# Patient Record
(demographics unavailable — no encounter records)

---

## 2025-02-10 NOTE — CARDIOLOGY SUMMARY
[Today's Date] : [unfilled] [FreeTextEntry1] : Normal Sinus Rhythm Normal Axis QTc 385-407 ms [FreeTextEntry2] : Summary: 1. No evidence of significant atrial communication; cannot rule out a patent foramen ovale. 2. Normal left ventricular size, morphology and systolic function. 3. Trivial mitral valve regurgitation. 4. Trivial tricuspid valve regurgitation, peak systolic instantaneous gradient 19.3 mmHg. 5. Trivial pulmonary valve regurgitation. 6. No pericardial effusion. [de-identified] : 02/04/2025 [de-identified] : A 7-day Holter was ordered

## 2025-02-10 NOTE — DISCUSSION/SUMMARY
[FreeTextEntry1] : RAVI's  workup revealed:  -Arrhythmias are not fully ruled out. A 7-day Holter was ordered -He  had the incidental finding of mitral insufficiency. The insufficiency did not appear to be hemodynamically significant -He  had the incidental finding of trivial tricuspid insufficiency. Trivial tricuspid insufficiency is a common finding, considered a physiologic variant of normal and  allowed us to calculate estimated pulmonary artery pressures as normal. -He had the incidental finding of pulmonary insufficiency. The insufficiency did not appear to be hemodynamically significant and represents a normal variant  He  does not require any restrictions from a cardiac standpoint.  He does not require antibiotic prophylaxis from a cardiac standpoint. He  should continue with his   routine pediatric care.   The importance of excellent hydration starting early in the morning and continue throughout the day was discussed at length. He should drink enough fluid to keep his  urine clear at all times. All caffeine should be removed from his  diet.  He  must return to cardiology followup if symptoms were to persist. [Needs SBE Prophylaxis] : [unfilled] does not need bacterial endocarditis prophylaxis [PE + No Restrictions] : [unfilled] may participate in the entire physical education program without restriction, including all varsity competitive sports. [Influenza vaccine is recommended] : Influenza vaccine is recommended

## 2025-02-10 NOTE — CONSULT LETTER
[Today's Date] : [unfilled] [Name] : Name: [unfilled] [] : : ~~ [Today's Date:] : [unfilled] [Dear  ___:] : Dear Dr. [unfilled]: [Consult] : I had the pleasure of evaluating your patient, [unfilled]. My full evaluation follows. [Consult - Single Provider] : Thank you very much for allowing me to participate in the care of this patient. If you have any questions, please do not hesitate to contact me. [Sincerely,] : Sincerely, [FreeTextEntry4] : Jennifer Oppenheim, MD [FreeTextEntry5] : [address] [FreeTextEntry6] : [place] [id number] [de-identified] : Barry E. Goldberg, MD FACC, FAAP, FACE [facility] Chief Pediatric Cardiology

## 2025-02-10 NOTE — HISTORY OF PRESENT ILLNESS
[FreeTextEntry1] : RAVI is a 16-year-old male who was referred for cardiology consultation due to syncope. He has had two syncopal episodes. He has had two syncopal episodes.  His last syncopal episode was 2 years ago First in 8th grade after he cut his fingers with sheers Second episode also when he was bleeding. He has complaints of dizziness and blurred vision. The symptoms occur when he stands up. Lately he had it while he was in a volleyball game One month ago, he was sick and got dizzy getting up from sitting on his couch. It lasts about one minute During the basketball he had symptoms. it was an early game, and he did not eat before the game He denies chest pain, palpitations, shortness of breath, diaphoresis, or nausea. He does not eat breakfast. He eats lunch and dinner Lunch is around 11:30 am He wakes up around 6 am He goes to sleep around 11 pm   When he was one he had a febrile seizure which lasted until around age 3 He has a history of pneumonia He does not wear glasses He has no ENT history   He   generally has good fluid intake and does not drink excessive caffeinated beverages.  He was born at term after an uneventful pregnancy. He was discharged with his mother.  He was hospitalized overnight for febrile seizure secondary to pneumonia.  He is a eusebio in high school He played volleyball on school team and plays travel volleyball.   Mom has hypothyroidism. Dad has high blood pressure. There are 3 siblings. One has hypothyroidism. His paternal grandfather had a heart attack at age 40. His paternal grandmother had Afib young in life. Importantly, there is no family history of recurrent syncope, premature sudden death, cardiomyopathy, arrhythmia, drowning, or unexplained accidental deaths.

## 2025-02-10 NOTE — PHYSICAL EXAM
[General Appearance - Alert] : alert [General Appearance - In No Acute Distress] : in no acute distress [General Appearance - Well Nourished] : well nourished [General Appearance - Well Developed] : well developed [General Appearance - Well-Appearing] : well appearing [Appearance Of Head] : the head was normocephalic [Facies] : there were no dysmorphic facial features [Sclera] : the conjunctiva were normal [Outer Ear] : the ears and nose were normal in appearance [Examination Of The Oral Cavity] : mucous membranes were moist and pink [Auscultation Breath Sounds / Voice Sounds] : breath sounds clear to auscultation bilaterally [Normal Chest Appearance] : the chest was normal in appearance [Apical Impulse] : quiet precordium with normal apical impulse [Heart Rate And Rhythm] : normal heart rate and rhythm [Heart Sounds] : normal S1 and S2 [No Murmur] : no murmurs  [Heart Sounds Gallop] : no gallops [Heart Sounds Pericardial Friction Rub] : no pericardial rub [Edema] : no edema [Arterial Pulses] : normal upper and lower extremity pulses with no pulse delay [Heart Sounds Click] : no clicks [Capillary Refill Test] : normal capillary refill [Bowel Sounds] : normal bowel sounds [Abdomen Soft] : soft [Nondistended] : nondistended [Abdomen Tenderness] : non-tender [Nail Clubbing] : no clubbing  or cyanosis of the fingers [Motor Tone] : normal muscle strength and tone [Cervical Lymph Nodes Enlarged Anterior] : The anterior cervical nodes were normal [Cervical Lymph Nodes Enlarged Posterior] : The posterior cervical nodes were normal [] : no rash [Skin Lesions] : no lesions [Skin Turgor] : normal turgor [Demonstrated Behavior - Infant Nonreactive To Parents] : interactive [Mood] : mood and affect were appropriate for age [Demonstrated Behavior] : normal behavior [EOMI] : ~T the extraocular movements were intact [PERRL With Normal Accommodation] : the pupils were equal in size, round, and reactive to light [Nasal Cavity] : the nasal mucosa was normal [Oropharynx] : the oropharynx was normal [No Cough] : no cough [Stridor] : no stridor was observed [Respiration, Rhythm And Depth] : normal respiratory rhythm and effort [Musculoskeletal Exam: Normal Movement Of All Extremities] : normal movements of all extremities [Skin Color & Pigmentation] : normal skin color and pigmentation

## 2025-02-10 NOTE — CARDIOLOGY SUMMARY
[Today's Date] : [unfilled] [FreeTextEntry1] : Normal Sinus Rhythm Normal Axis QTc 385-407 ms [FreeTextEntry2] : Summary: 1. No evidence of significant atrial communication; cannot rule out a patent foramen ovale. 2. Normal left ventricular size, morphology and systolic function. 3. Trivial mitral valve regurgitation. 4. Trivial tricuspid valve regurgitation, peak systolic instantaneous gradient 19.3 mmHg. 5. Trivial pulmonary valve regurgitation. 6. No pericardial effusion. [de-identified] : 02/04/2025 [de-identified] : A 7-day Holter was ordered

## 2025-02-10 NOTE — CONSULT LETTER
Refill sent to pharmacy by Dr Resendiz   [Today's Date] : [unfilled] [Name] : Name: [unfilled] [] : : ~~ [Today's Date:] : [unfilled] [Dear  ___:] : Dear Dr. [unfilled]: [Consult] : I had the pleasure of evaluating your patient, [unfilled]. My full evaluation follows. [Consult - Single Provider] : Thank you very much for allowing me to participate in the care of this patient. If you have any questions, please do not hesitate to contact me. [Sincerely,] : Sincerely, [FreeTextEntry4] : Jennifer Oppenheim, MD [FreeTextEntry5] : [address] [FreeTextEntry6] : [place] [id number] [de-identified] : Barry E. Goldberg, MD FACC, FAAP, FACE [facility] Chief Pediatric Cardiology

## 2025-07-08 NOTE — PLAN
[FreeTextEntry1] : Discussed results; no ASM at this time Will order MRI brain to r/o structural lesion Discussed importance of adequate fluid and electrolyte intake; if he feels prodromal symptoms of dizziness, he should lie down and elevate his legs, and drinks fluids/electrolytes Seizure precautions discussed, patient has nayzilam PRN seizure > 3-5 minutes F/u with cardiology

## 2025-07-08 NOTE — HISTORY OF PRESENT ILLNESS
[FreeTextEntry1] : RAVI BANSAL is a 17 year old boy who presents for hospital follow up.    He was hospitalized at [facility] 6/24/25-6/25/25.  He had two seizure-like episodes on the day of presentation.  He was c/o dizziness several times throughout the day after working outside in the heat. He had not drank much fluids and eaten only a hot dog that day.  He had one brief episode of lip smacking and staring/unresponsive for a few seconds that was witnessed by his father. He returned to baseline and was able to take his 's test. Upon arriving home, he had a tonic-clonic seizure episode around 4:30pm where he fell onto the kitchen floor and hit the right side of his head, which lasted approximately 2 minutes. No incontinence or tongue bite.  He himself recalls falling dizzy prior to the event. He was brought to an OSH where he had a normal CT head, WBC 19, and CMP unremarkable.  He received IVF and was discharged home.  While in the car, he experienced another tonic-clonic episode in the car lasting around 8:30pm and was brought back to the ED and given Keppra 2g IV. Just prior to the episode, the patient states that he was feeling "out of it" and needed longer than usual time to respond to questioning. Upon arriving back to the hospital, he was found to be postictal for about 2 hours with extreme fatigue and some confusion. [facility] for further work up. He has otherwise been in good health, denies any recent illness, diarrhea, vomiting, recent travel. Will admit to unit for further evaluation and vEEG.  vEEG was normal.  He was discharged with seizure precautions and nayzilam PRN.  Prior this, he had two prior syncopal episodes several years ago.  Both were associated with bleeding, e.g. after he cut his fingers with ernestina.  He has experienced frequent dizziness that can occur with orthostatic changes or when playing basketball or volleyball.  He was seen by cardiology and had an unremarkable work up, though Holter was not yet done.  Denies headache, N/V. He is an active athlete and plays volleyball 3 days/week He generally drinks well and recently added electrolytes to his fluids during sports but has still had occasional dizziness  FT, unremarkable pregnancy and delivery Normal development Two prior febrile seizures from the age of 1-3 years old  No family history of epilepsy

## 2025-07-08 NOTE — PHYSICAL EXAM
[Well-appearing] : well-appearing [Normocephalic] : normocephalic [No dysmorphic facial features] : no dysmorphic facial features [No ocular abnormalities] : no ocular abnormalities [Neck supple] : neck supple [No deformities] : no deformities [Alert] : alert [Well related, good eye contact] : well related, good eye contact [Conversant] : conversant [Normal speech and language] : normal speech and language [Follows instructions well] : follows instructions well [VFF] : VFF [Pupils reactive to light and accommodation] : pupils reactive to light and accommodation [Full extraocular movements] : full extraocular movements [Saccadic and smooth pursuits intact] : saccadic and smooth pursuits intact [No nystagmus] : no nystagmus [Normal facial sensation to light touch] : normal facial sensation to light touch [No facial asymmetry or weakness] : no facial asymmetry or weakness [Gross hearing intact] : gross hearing intact [Equal palate elevation] : equal palate elevation [Good shoulder shrug] : good shoulder shrug [Normal tongue movement] : normal tongue movement [Midline tongue, no fasciculations] : midline tongue, no fasciculations [Gets up on table without difficulty] : gets up on table without difficulty [No pronator drift] : no pronator drift [Normal finger tapping and fine finger movements] : normal finger tapping and fine finger movements [No abnormal involuntary movements] : no abnormal involuntary movements [5/5 strength in proximal and distal muscles of arms and legs] : 5/5 strength in proximal and distal muscles of arms and legs [Able to walk on toes] : able to walk on toes [No dysmetria on FTNT] : no dysmetria on FTNT [Good walking balance] : good walking balance [Normal gait] : normal gait [Able to tandem well] : able to tandem well [Negative Romberg] : negative Romberg

## 2025-07-08 NOTE — ASSESSMENT
[FreeTextEntry1] : 16 yo teen boy with history of febrile seizures and syncope p/w two convulsive episodes within 24 hours in setting of dehydration, heat.  Normal VEEG, HCT.  Nonfocal neurological exam.

## 2025-07-09 NOTE — PHYSICAL EXAM
[General Appearance - Alert] : alert [General Appearance - In No Acute Distress] : in no acute distress [General Appearance - Well Nourished] : well nourished [General Appearance - Well Developed] : well developed [General Appearance - Well-Appearing] : well appearing [Appearance Of Head] : the head was normocephalic [Facies] : there were no dysmorphic facial features [Sclera] : the conjunctiva were normal [EOMI] : ~T the extraocular movements were intact [PERRL With Normal Accommodation] : the pupils were equal in size, round, and reactive to light [Outer Ear] : the ears and nose were normal in appearance [Nasal Cavity] : the nasal mucosa was normal [Examination Of The Oral Cavity] : mucous membranes were moist and pink [Oropharynx] : the oropharynx was normal [No Cough] : no cough [Auscultation Breath Sounds / Voice Sounds] : breath sounds clear to auscultation bilaterally [Stridor] : no stridor was observed [Respiration, Rhythm And Depth] : normal respiratory rhythm and effort [Normal Chest Appearance] : the chest was normal in appearance [Apical Impulse] : quiet precordium with normal apical impulse [Heart Rate And Rhythm] : normal heart rate and rhythm [Heart Sounds] : normal S1 and S2 [No Murmur] : no murmurs  [Heart Sounds Gallop] : no gallops [Heart Sounds Pericardial Friction Rub] : no pericardial rub [Edema] : no edema [Arterial Pulses] : normal upper and lower extremity pulses with no pulse delay [Heart Sounds Click] : no clicks [Capillary Refill Test] : normal capillary refill [Bowel Sounds] : normal bowel sounds [Abdomen Soft] : soft [Nondistended] : nondistended [Abdomen Tenderness] : non-tender [Nail Clubbing] : no clubbing  or cyanosis of the fingers [Musculoskeletal Exam: Normal Movement Of All Extremities] : normal movements of all extremities [Motor Tone] : normal muscle strength and tone [Cervical Lymph Nodes Enlarged Anterior] : The anterior cervical nodes were normal [] : no rash [Skin Lesions] : no lesions [Skin Turgor] : normal turgor [Skin Color & Pigmentation] : normal skin color and pigmentation [Demonstrated Behavior - Infant Nonreactive To Parents] : interactive [Mood] : mood and affect were appropriate for age [Demonstrated Behavior] : normal behavior

## 2025-07-09 NOTE — REASON FOR VISIT
[Follow-Up] : a follow-up visit for [Dizziness/Lightheadedness] : dizziness/lightheadedness [Patient] : patient [Parents] : parents

## 2025-07-15 NOTE — CONSULT LETTER
[Today's Date] : [unfilled] [Name] : Name: [unfilled] [] : : ~~ [Today's Date:] : [unfilled] [Dear  ___:] : Dear Dr. [unfilled]: [Consult] : I had the pleasure of evaluating your patient, [unfilled]. My full evaluation follows. [Consult - Single Provider] : Thank you very much for allowing me to participate in the care of this patient. If you have any questions, please do not hesitate to contact me. [Sincerely,] : Sincerely, [FreeTextEntry4] : Jennifer Oppenheim, MD [FreeTextEntry5] : [address] [FreeTextEntry6] : [place] [id number] [de-identified] : Barry E. Goldberg, MD FACC, FAAP, FACE [facility] Chief Pediatric Cardiology

## 2025-07-15 NOTE — CONSULT LETTER
[Today's Date] : [unfilled] [Name] : Name: [unfilled] [] : : ~~ [Today's Date:] : [unfilled] [Dear  ___:] : Dear Dr. [unfilled]: [Consult] : I had the pleasure of evaluating your patient, [unfilled]. My full evaluation follows. [Consult - Single Provider] : Thank you very much for allowing me to participate in the care of this patient. If you have any questions, please do not hesitate to contact me. [Sincerely,] : Sincerely, [FreeTextEntry4] : Jennifer Oppenheim, MD [FreeTextEntry5] : [address] [FreeTextEntry6] : [place] [id number] [de-identified] : Barry E. Goldberg, MD FACC, FAAP, FACE [facility] Chief Pediatric Cardiology

## 2025-07-15 NOTE — REVIEW OF SYSTEMS
[Diaphoresis] : diaphoretic [Seizure] : seizures [Headache] : headache [Dizziness] : dizziness [Feeling Poorly] : not feeling poorly (malaise) [Fever] : no fever [Wgt Loss (___ Lbs)] : no recent weight loss [Pallor] : not pale [Eye Discharge] : no eye discharge [Redness] : no redness [Change in Vision] : no change in vision [Nasal Stuffiness] : no nasal congestion [Sore Throat] : no sore throat [Earache] : no earache [Loss Of Hearing] : no hearing loss [Cyanosis] : no cyanosis [Edema] : no edema [Chest Pain] : no chest pain or discomfort [Exercise Intolerance] : no persistence of exercise intolerance [Palpitations] : no palpitations [Orthopnea] : no orthopnea [Fast HR] : no tachycardia [Tachypnea] : not tachypneic [Wheezing] : no wheezing [Cough] : no cough [Shortness Of Breath] : not expressed as feeling short of breath [Vomiting] : no vomiting [Diarrhea] : no diarrhea [Abdominal Pain] : no abdominal pain [Decrease In Appetite] : appetite not decreased [Fainting (Syncope)] : no fainting [Limping] : no limping [Joint Pains] : no arthralgias [Joint Swelling] : no joint swelling [Rash] : no rash [Wound problems] : no wound problems [Easy Bruising] : no tendency for easy bruising [Swollen Glands] : no lymphadenopathy [Easy Bleeding] : no ~M tendency for easy bleeding [Nosebleeds] : no epistaxis [Sleep Disturbances] : ~T no sleep disturbances [Hyperactive] : no hyperactive behavior [Depression] : no depression [Anxiety] : no anxiety [Failure To Thrive] : no failure to thrive [Short Stature] : short stature was not noted [Jitteriness] : no jitteriness [Heat/Cold Intolerance] : no temperature intolerance [Dec Urine Output] : no oliguria

## 2025-07-15 NOTE — CARDIOLOGY SUMMARY
[Today's Date] : [unfilled] [FreeTextEntry1] : Normal Sinus Rhythm with sinus arrhythmia Rightward Axis early repolarization QTc 387-420 ms  [de-identified] : 07/09/2025 [FreeTextEntry2] : Summary: 1. Normal left ventricular size, morphology and systolic function. 2. Trivial tricuspid valve regurgitation, peak systolic instantaneous gradient 17.5 mmHg. 3. Trivial pulmonary valve regurgitation. 4. No evidence of significant atrial communication; cannot rule out a patent foramen ovale. 5. No pericardial effusion. [de-identified] : 07/09/2025 [de-identified] : The results of the 3 day Holter monitor ordered at last visit reviewed in detail today. The heart rate ranged from  beats per minute with an average of 73  beats per minute. The predominant rhythm was normal sinus rhythm alternating with sinus bradycardia, sinus tachycardia and sinus arrhythmia. There were 144 supraventricular premature beats including a triplet. There were no ventricular premature beats. There were no symptoms reported during the monitoring period.  A  new 7-day Holter was ordered

## 2025-07-15 NOTE — DISCUSSION/SUMMARY
[PE + No Restrictions] : [unfilled] may participate in the entire physical education program without restriction, including all varsity competitive sports. [Influenza vaccine is recommended] : Influenza vaccine is recommended [FreeTextEntry1] : RAVI's  workup revealed: -I am very concerned about this episode. While there is an entity of syncope induced seizure, the second seizure occurred while sitting in the rell. There was not a vasovagal event at that time. He has a MRI scheduled for Jul 14, 2025.  -Arrhythmias were not seen on the prior Holter monitor. However, arrhythmias are not fully ruled out. A new 7-day Holter was ordered -He had the incidental finding of trivial tricuspid insufficiency. Trivial tricuspid insufficiency is a common finding, considered a physiologic variant of normal and  allowed us to calculate estimated pulmonary artery pressures as normal. -He had the incidental finding of pulmonary insufficiency. The insufficiency did not appear to be hemodynamically significant and represents a normal variant  He  does not require any restrictions from a cardiac standpoint.  He does not require antibiotic prophylaxis from a cardiac standpoint. He  should continue with his   routine pediatric care.   The importance of excellent hydration starting early in the morning and continue throughout the day was discussed at length. He should drink enough fluid to keep his urine clear at all times. All caffeine should be removed from his diet. He must add more salt to his diet. Will consider Fludrocortisone if MRI is normal.   New blood work was ordered and is pending.   [Needs SBE Prophylaxis] : [unfilled] does not need bacterial endocarditis prophylaxis

## 2025-07-15 NOTE — CLINICAL NARRATIVE
[Up to Date] : Up to Date [FreeTextEntry1] : as per mom [FreeTextEntry2] : Hospitalized 6/24-6/25 at [facility] [place] for seizure like activity.

## 2025-07-15 NOTE — CARDIOLOGY SUMMARY
[Today's Date] : [unfilled] [FreeTextEntry1] : Normal Sinus Rhythm with sinus arrhythmia Rightward Axis early repolarization QTc 387-420 ms  [de-identified] : 07/09/2025 [FreeTextEntry2] : Summary: 1. Normal left ventricular size, morphology and systolic function. 2. Trivial tricuspid valve regurgitation, peak systolic instantaneous gradient 17.5 mmHg. 3. Trivial pulmonary valve regurgitation. 4. No evidence of significant atrial communication; cannot rule out a patent foramen ovale. 5. No pericardial effusion. [de-identified] : 07/09/2025 [de-identified] : The results of the 3 day Holter monitor ordered at last visit reviewed in detail today. The heart rate ranged from  beats per minute with an average of 73  beats per minute. The predominant rhythm was normal sinus rhythm alternating with sinus bradycardia, sinus tachycardia and sinus arrhythmia. There were 144 supraventricular premature beats including a triplet. There were no ventricular premature beats. There were no symptoms reported during the monitoring period.  A  new 7-day Holter was ordered

## 2025-07-15 NOTE — HISTORY OF PRESENT ILLNESS
[FreeTextEntry1] : RAVI is a 16-year-old male who was referred for cardiology consultation due to another episode of syncope. He now has had three syncopal episodes. He was working for 3 or 4 hours outdoors in a backyard. he got a little dizzy. He went inside cooled down He went home. He got dizzy again. 4 hours later he went for his road test.  After the road test he went home, and he passed out. He had a tonic clonic seizure which lasted two to three minutes He went to [facility]. (it was his first seizure since infancy he had ) He was discharged. After discharge he was sitting in the car and he had another tonic clonic seizure. He went back to the hospital. He was transferred to [facility] [place]. A neurology consultation did not reveal any abnormalities.  He is a heavy sweater. He has frequent dizziness. he smokes marijuana that is "grown by his friend" To review: His prior syncopal episode was 2 years ago First in 8th grade after he cut his fingers with sheers Second episode also when he was bleeding. He has complaints of dizziness and blurred vision. The symptoms occur when he stands up. Lately he had it while he was in a volleyball game One month ago, he was sick and got dizzy getting up from sitting on his couch. It lasts about one minute During the basketball he had symptoms. it was an early game, and he did not eat before the game He denies chest pain, palpitations, shortness of breath, diaphoresis, or nausea. He has a lot of yawning  When he was one he had a febrile seizure which lasted until around age 3.  He was hospitalized overnight for febrile seizure secondary to pneumonia.   He   generally has good fluid intake and does not drink excessive caffeinated beverages. He was born at term after an uneventful pregnancy. He was discharged with his mother. He is a eusebio in high school He played volleyball on school team and plays travel volleyball.   Mom has hypothyroidism. Dad has high blood pressure. There are 3 siblings. One has hypothyroidism. His paternal grandfather had a heart attack at age 40. His paternal grandmother had Afib young in life. Importantly, there is no family history of recurrent syncope, premature sudden death, cardiomyopathy, arrhythmia, drowning, or unexplained accidental deaths.

## 2025-07-15 NOTE — HISTORY OF PRESENT ILLNESS
[FreeTextEntry1] : RAVI is a 16-year-old male who was referred for cardiology consultation due to another episode of syncope. He now has had three syncopal episodes. He was working for 3 or 4 hours outdoors in a backyard. he got a little dizzy. He went inside cooled down He went home. He got dizzy again. 4 hours later he went for his road test.  After the road test he went home, and he passed out. He had a tonic clonic seizure which lasted two to three minutes He went to [facility]. (it was his first seizure since infancy he had ) He was discharged. After discharge he was sitting in the car and he had another tonic clonic seizure. He went back to the hospital. He was transferred to [facility] [place]. A neurology consultation did not reveal any abnormalities.  He is a heavy sweater. He has frequent dizziness. he smokes marijuana that is "grown by his friend" To review: His prior syncopal episode was 2 years ago First in 8th grade after he cut his fingers with sheers Second episode also when he was bleeding. He has complaints of dizziness and blurred vision. The symptoms occur when he stands up. Lately he had it while he was in a volleyball game One month ago, he was sick and got dizzy getting up from sitting on his couch. It lasts about one minute During the basketball he had symptoms. it was an early game, and he did not eat before the game He denies chest pain, palpitations, shortness of breath, diaphoresis, or nausea. He has a lot of yawning  When he was one he had a febrile seizure which lasted until around age 3.  He was hospitalized overnight for febrile seizure secondary to pneumonia.   He   generally has good fluid intake and does not drink excessive caffeinated beverages. He was born at term after an uneventful pregnancy. He was discharged with his mother. He is a eusebio in high school He played volleyball on school team and plays travel volleyball.   Mom has hypothyroidism. Dad has high blood pressure. There are 3 siblings. One has hypothyroidism. His paternal grandfather had a heart attack at age 40. His paternal grandmother had Afib young in life. Importantly, there is no family history of recurrent syncope, premature sudden death, cardiomyopathy, arrhythmia, drowning, or unexplained accidental deaths.

## 2025-07-15 NOTE — CLINICAL NARRATIVE
[Up to Date] : Up to Date [FreeTextEntry1] : as per mom [FreeTextEntry2] : Hospitalized 6/24-6/25 at [facility] for seizure like activity.